# Patient Record
Sex: MALE | Race: ASIAN | Employment: STUDENT | URBAN - METROPOLITAN AREA
[De-identification: names, ages, dates, MRNs, and addresses within clinical notes are randomized per-mention and may not be internally consistent; named-entity substitution may affect disease eponyms.]

---
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
## 2022-05-20 ENCOUNTER — TELEPHONE (OUTPATIENT)
Dept: SPORTS MEDICINE | Age: 22
End: 2022-05-20

## 2022-05-20 ENCOUNTER — IMAGING SERVICES (OUTPATIENT)
Dept: GENERAL RADIOLOGY | Age: 22
End: 2022-05-20
Attending: PEDIATRICS

## 2022-05-20 ENCOUNTER — LAB SERVICES (OUTPATIENT)
Dept: LAB | Age: 22
End: 2022-05-20

## 2022-05-20 ENCOUNTER — OFFICE VISIT (OUTPATIENT)
Dept: SPORTS MEDICINE | Age: 22
End: 2022-05-20

## 2022-05-20 VITALS
SYSTOLIC BLOOD PRESSURE: 114 MMHG | DIASTOLIC BLOOD PRESSURE: 66 MMHG | WEIGHT: 133 LBS | HEIGHT: 73 IN | HEART RATE: 51 BPM | BODY MASS INDEX: 17.63 KG/M2

## 2022-05-20 DIAGNOSIS — M25.562 PAIN, JOINT, LOWER LEG, LEFT: ICD-10-CM

## 2022-05-20 DIAGNOSIS — M79.661 PAIN IN RIGHT LOWER LEG: ICD-10-CM

## 2022-05-20 DIAGNOSIS — M84.361A STRESS FRACTURE OF RIGHT TIBIA, INITIAL ENCOUNTER: Primary | ICD-10-CM

## 2022-05-20 DIAGNOSIS — M84.361A STRESS FRACTURE OF RIGHT TIBIA, INITIAL ENCOUNTER: ICD-10-CM

## 2022-05-20 LAB — 25(OH)D3 SERPL-MCNC: 32.8 NG/ML (ref 30–100)

## 2022-05-20 PROCEDURE — E0114 CRUTCH UNDERARM PAIR NO WOOD: HCPCS | Performed by: PEDIATRICS

## 2022-05-20 PROCEDURE — 73590 X-RAY EXAM OF LOWER LEG: CPT | Performed by: RADIOLOGY

## 2022-05-20 PROCEDURE — 99204 OFFICE O/P NEW MOD 45 MIN: CPT | Performed by: PEDIATRICS

## 2022-05-20 PROCEDURE — 36415 COLL VENOUS BLD VENIPUNCTURE: CPT | Performed by: PEDIATRICS

## 2022-05-20 PROCEDURE — 82306 VITAMIN D 25 HYDROXY: CPT | Performed by: PEDIATRICS

## 2022-05-20 RX ORDER — AMOXICILLIN AND CLAVULANATE POTASSIUM 875; 125 MG/1; MG/1
1 TABLET, FILM COATED ORAL 2 TIMES DAILY
COMMUNITY
Start: 2022-05-09

## 2022-06-01 ENCOUNTER — EXTERNAL RECORD (OUTPATIENT)
Dept: HEALTH INFORMATION MANAGEMENT | Facility: OTHER | Age: 22
End: 2022-06-01

## 2022-06-17 ENCOUNTER — APPOINTMENT (OUTPATIENT)
Dept: GENERAL RADIOLOGY | Age: 22
End: 2022-06-17
Attending: PEDIATRICS

## 2022-06-17 ENCOUNTER — OFFICE VISIT (OUTPATIENT)
Dept: SPORTS MEDICINE | Age: 22
End: 2022-06-17

## 2022-06-17 ENCOUNTER — IMAGING SERVICES (OUTPATIENT)
Dept: GENERAL RADIOLOGY | Age: 22
End: 2022-06-17
Attending: PEDIATRICS

## 2022-06-17 VITALS
WEIGHT: 133 LBS | HEIGHT: 73 IN | BODY MASS INDEX: 17.63 KG/M2 | DIASTOLIC BLOOD PRESSURE: 73 MMHG | SYSTOLIC BLOOD PRESSURE: 115 MMHG

## 2022-06-17 DIAGNOSIS — M84.361D STRESS FRACTURE OF RIGHT TIBIA WITH ROUTINE HEALING, SUBSEQUENT ENCOUNTER: Primary | ICD-10-CM

## 2022-06-17 DIAGNOSIS — M84.361D STRESS FRACTURE OF RIGHT TIBIA WITH ROUTINE HEALING, SUBSEQUENT ENCOUNTER: ICD-10-CM

## 2022-06-17 PROCEDURE — 73590 X-RAY EXAM OF LOWER LEG: CPT | Performed by: PEDIATRICS

## 2022-06-17 PROCEDURE — 99214 OFFICE O/P EST MOD 30 MIN: CPT | Performed by: PEDIATRICS

## 2022-06-20 ENCOUNTER — EXTERNAL RECORD (OUTPATIENT)
Dept: HEALTH INFORMATION MANAGEMENT | Facility: OTHER | Age: 22
End: 2022-06-20

## 2022-06-27 ENCOUNTER — EXTERNAL RECORD (OUTPATIENT)
Dept: HEALTH INFORMATION MANAGEMENT | Facility: OTHER | Age: 22
End: 2022-06-27

## 2022-07-08 ENCOUNTER — EXTERNAL RECORD (OUTPATIENT)
Dept: HEALTH INFORMATION MANAGEMENT | Facility: OTHER | Age: 22
End: 2022-07-08

## 2025-06-11 ENCOUNTER — OFFICE VISIT (OUTPATIENT)
Dept: FAMILY MEDICINE CLINIC | Facility: CLINIC | Age: 25
End: 2025-06-11
Payer: COMMERCIAL

## 2025-06-11 ENCOUNTER — HOSPITAL ENCOUNTER (OUTPATIENT)
Dept: GENERAL RADIOLOGY | Age: 25
Discharge: HOME | End: 2025-06-11
Attending: FAMILY MEDICINE
Payer: COMMERCIAL

## 2025-06-11 VITALS
WEIGHT: 141 LBS | BODY MASS INDEX: 19.1 KG/M2 | HEART RATE: 84 BPM | DIASTOLIC BLOOD PRESSURE: 64 MMHG | TEMPERATURE: 99 F | SYSTOLIC BLOOD PRESSURE: 108 MMHG | HEIGHT: 72 IN

## 2025-06-11 DIAGNOSIS — Z00.00 ROUTINE ADULT HEALTH MAINTENANCE: ICD-10-CM

## 2025-06-11 DIAGNOSIS — M54.42 ACUTE LEFT-SIDED LOW BACK PAIN WITH LEFT-SIDED SCIATICA: ICD-10-CM

## 2025-06-11 DIAGNOSIS — M54.42 ACUTE LEFT-SIDED LOW BACK PAIN WITH LEFT-SIDED SCIATICA: Primary | ICD-10-CM

## 2025-06-11 PROCEDURE — 72114 X-RAY EXAM L-S SPINE BENDING: CPT | Performed by: FAMILY MEDICINE

## 2025-06-11 PROCEDURE — 99202 OFFICE O/P NEW SF 15 MIN: CPT | Performed by: FAMILY MEDICINE

## 2025-06-12 NOTE — PROGRESS NOTES
The following individual(s) verbally consented to be recorded using ambient AI listening technology and understand that they can each withdraw their consent to this listening technology at any point by asking the clinician to turn off or pause the recording:    Patient name: Deric Allen  Additional names:  no  Subjective:   Deric Allen is a 24 year old male who presents for Back Pain (Started in Jan lower right back pain, it did go away but has returned. He does have sciatica pain down right leg.Has been taking Aleve with good results)       History/Other:   History of Present Illness  Deric Allen is a 24 year old male who presents with recurrent lower back pain and left leg discomfort.    He has been experiencing lower back pain on the left side since January, described as a 'pins and needles' sensation radiating down his left leg. Initially, Aleve provided relief, and the symptoms resolved until late May.    In late May, the symptoms recurred with similar characteristics, including discomfort in the lower left back and a 'pins and needles' sensation down the left leg. The symptoms were particularly noticeable last Tuesday, causing his leg and foot to feel tired despite no specific activity. Currently, the discomfort is milder and intermittent.    No specific injury or incident triggered the pain. He continues his usual workouts without any change in symptoms. The discomfort does not worsen with physical activity, and he has not altered his habits due to the pain. He is a long distance runner and has done a triathalon as well.     The discomfort is localized to the left side, with no pain on the right side. The sensation radiates down to the Achilles tendon but does not affect the bottom of the foot. No pain upon palpation or movement of the leg and back.     Chief Complaint Reviewed and Verified  Nursing Notes Reviewed and   Verified  Tobacco Reviewed  Allergies Reviewed  Medications Reviewed     Social History Reviewed         Tobacco:  He has never smoked tobacco.    Current Medications[1]    PHQ-2 SCORE: 0  , done 6/11/2025   Last [place] Suicide Screening on 6/11/2025 was No Risk.       Review of Systems:  Pertinent items are noted in HPI.  A comprehensive review of systems was negative.      Objective:   /64   Pulse 84   Temp 98.6 °F (37 °C) (Oral)   Ht 6' (1.829 m)   Wt 141 lb (64 kg)   BMI 19.12 kg/m²  Estimated body mass index is 19.12 kg/m² as calculated from the following:    Height as of this encounter: 6' (1.829 m).    Weight as of this encounter: 141 lb (64 kg).  Results         Physical Exam  GENERAL: Alert, cooperative, well developed, no acute distress.  HEENT: Normocephalic, normal oropharynx, moist mucous membranes.  CHEST: Clear to auscultation bilaterally, no wheezes, rhonchi, or crackles.  CARDIOVASCULAR: Normal heart rate and rhythm, S1 and S2 normal without murmurs.  ABDOMEN: Soft, non-tender, non-distended, without organomegaly, normal bowel sounds.  EXTREMITIES: No cyanosis or edema.  MUSCULOSKELETAL: Pain in lower left back, no tenderness on palpation of lower back. No pain on hip flexion, extension, contralateral hip flexion, resisted dorsiflexion, or straight leg raise.  NEUROLOGICAL: Cranial nerves grossly intact, moves all extremities without gross motor or sensory deficit.  Strength is 5/5 both lower extremities negative straight leg raise bilateral sensation intact neurovascular intact    Assessment & Plan:   1. Acute left-sided low back pain with left-sided sciatica (Primary)  -     XR LUMBAR SPINE COMPLETE W/FLEX + EXT (CPT=72114); Future; Expected date: 06/11/2025  2. Routine adult health maintenance  -     CBC, Platelet; No Differential  -     Comp Metabolic Panel (14)  -     Lipid Panel    Assessment & Plan  Left-sided lower back pain with radiculopathy  Intermittent left-sided lower back discomfort with radiation of a pins and needles sensation down the  left leg to the Achilles tendon. Symptoms initially appeared in January, resolved with Aleve, and recurred in late May. Differential diagnosis includes a herniated disc with a pinched nerve, sciatica, or muscular pain. A pinched nerve is suspected, either due to a herniated disc or sciatica, given the radiation of symptoms. Muscular pain is considered less likely as it typically does not radiate to the foot. X-rays will help distinguish between muscular pain and a disc problem by showing vertebrae and disc space narrowing. If narrowing is observed, an MRI may be necessary to confirm the diagnosis.  - Order x-rays of the lower back with flexion and extension views to assess for disc space narrowing at an [facility] facility, avoiding emergency room facilities to reduce costs.  - Perform blood tests at a [facility] lab, preferably fasting, to check cholesterol, blood sugar, liver function, kidney function, white blood cell count, and hemoglobin.  - Provide a sheet of exercises to help alleviate symptoms by stretching the area around the sciatic nerve.  - Advise taking Aleve or ibuprofen as needed for pain management.  - Consider a course of steroid medication if diagnosis confirms inflammation and pain persists.        No follow-ups on file.      Spent a total of 30 minutes obtaining history evaluating patient, reviewing medical chart, discussing treatment options and completing documentation.    Hoang Adair MD, 6/12/2025, 5:36 PM             [1]   No current outpatient medications on file.

## 2025-06-14 LAB
ALBUMIN/GLOBULIN RATIO: 1.9 (CALC) (ref 1–2.5)
ALBUMIN: 4.6 G/DL (ref 3.6–5.1)
ALKALINE PHOSPHATASE: 47 U/L (ref 36–130)
ALT: 21 U/L (ref 9–46)
AST: 27 U/L (ref 10–40)
BILIRUBIN, TOTAL: 0.9 MG/DL (ref 0.2–1.2)
BUN: 20 MG/DL (ref 7–25)
CALCIUM: 9.5 MG/DL (ref 8.6–10.3)
CARBON DIOXIDE: 26 MMOL/L (ref 20–32)
CHLORIDE: 104 MMOL/L (ref 98–110)
CHOL/HDLC RATIO: 2.5 (CALC)
CHOLESTEROL, TOTAL: 173 MG/DL
CREATININE: 0.91 MG/DL (ref 0.6–1.24)
EGFR: 122 ML/MIN/1.73M2
GLOBULIN: 2.4 G/DL (CALC) (ref 1.9–3.7)
GLUCOSE: 81 MG/DL (ref 65–99)
HDL CHOLESTEROL: 69 MG/DL
HEMATOCRIT: 46.1 % (ref 38.5–50)
HEMOGLOBIN: 14.9 G/DL (ref 13.2–17.1)
LDL-CHOLESTEROL: 89 MG/DL (CALC)
MCH: 30.8 PG (ref 27–33)
MCHC: 32.3 G/DL (ref 32–36)
MCV: 95.4 FL (ref 80–100)
MPV: 11.2 FL (ref 7.5–12.5)
NON-HDL CHOLESTEROL: 104 MG/DL (CALC)
PLATELET COUNT: 181 THOUSAND/UL (ref 140–400)
POTASSIUM: 4.2 MMOL/L (ref 3.5–5.3)
PROTEIN, TOTAL: 7 G/DL (ref 6.1–8.1)
RDW: 13.3 % (ref 11–15)
RED BLOOD CELL COUNT: 4.83 MILLION/UL (ref 4.2–5.8)
SODIUM: 139 MMOL/L (ref 135–146)
TRIGLYCERIDES: 64 MG/DL
WHITE BLOOD CELL COUNT: 4.2 THOUSAND/UL (ref 3.8–10.8)

## 2025-06-16 RX ORDER — METHYLPREDNISOLONE 4 MG/1
TABLET ORAL
Qty: 21 TABLET | Refills: 0 | Status: SHIPPED | OUTPATIENT
Start: 2025-06-16

## 2025-06-16 NOTE — PROGRESS NOTES
I did review labs and x-ray with patient.  X-ray of lumbar spine appears normal and lab work looks good.  No serious issues suspected in regards to his pain at this point.  I would like him to try a Medrol Dosepak as he has been doing his exercises over the past 4 days but has not noticed any change.  If he does not improve with Medrol Dosepak then I will order an MRI to be done.

## (undated) NOTE — LETTER
Date: 2/20/2019    Patient Name: Milli Rivera          To Whom it may concern:      Please excuse from school for 2/20/19 and 2/21/19 due to an illness. No gym or sports from 2/20/19 - 2/24/19.          Sincerely,    Douglas Tiwari MD